# Patient Record
Sex: FEMALE | Race: WHITE | Employment: OTHER | URBAN - METROPOLITAN AREA
[De-identification: names, ages, dates, MRNs, and addresses within clinical notes are randomized per-mention and may not be internally consistent; named-entity substitution may affect disease eponyms.]

---

## 2020-10-15 ENCOUNTER — HOSPITAL ENCOUNTER (OUTPATIENT)
Facility: CLINIC | Age: 65
End: 2020-10-15
Attending: OPHTHALMOLOGY | Admitting: OPHTHALMOLOGY
Payer: COMMERCIAL

## 2020-10-15 DIAGNOSIS — Z11.59 ENCOUNTER FOR SCREENING FOR OTHER VIRAL DISEASES: Primary | ICD-10-CM

## 2020-11-14 DIAGNOSIS — Z11.59 ENCOUNTER FOR SCREENING FOR OTHER VIRAL DISEASES: ICD-10-CM

## 2020-11-14 PROCEDURE — U0003 INFECTIOUS AGENT DETECTION BY NUCLEIC ACID (DNA OR RNA); SEVERE ACUTE RESPIRATORY SYNDROME CORONAVIRUS 2 (SARS-COV-2) (CORONAVIRUS DISEASE [COVID-19]), AMPLIFIED PROBE TECHNIQUE, MAKING USE OF HIGH THROUGHPUT TECHNOLOGIES AS DESCRIBED BY CMS-2020-01-R: HCPCS | Performed by: OPHTHALMOLOGY

## 2020-11-15 LAB
SARS-COV-2 RNA SPEC QL NAA+PROBE: NOT DETECTED
SPECIMEN SOURCE: NORMAL

## 2020-11-16 RX ORDER — LOSARTAN POTASSIUM AND HYDROCHLOROTHIAZIDE 12.5; 1 MG/1; MG/1
1 TABLET ORAL
COMMUNITY
Start: 2020-05-20 | End: 2020-11-17 | Stop reason: HOSPADM

## 2020-11-16 RX ORDER — BUPROPION HYDROCHLORIDE 150 MG/1
150 TABLET ORAL
COMMUNITY
Start: 2020-05-20 | End: 2020-11-17 | Stop reason: HOSPADM

## 2020-11-17 ENCOUNTER — ANESTHESIA EVENT (OUTPATIENT)
Dept: SURGERY | Facility: CLINIC | Age: 65
End: 2020-11-17

## 2020-11-17 RX ORDER — CYCLOPENTOLATE HYDROCHLORIDE 10 MG/ML
1 SOLUTION/ DROPS OPHTHALMIC
Status: CANCELLED | OUTPATIENT
Start: 2020-11-17

## 2020-11-17 RX ORDER — PHENYLEPHRINE HYDROCHLORIDE 25 MG/ML
1 SOLUTION/ DROPS OPHTHALMIC
Status: CANCELLED | OUTPATIENT
Start: 2020-11-17

## 2020-11-17 RX ORDER — TROPICAMIDE 10 MG/ML
1 SOLUTION/ DROPS OPHTHALMIC
Status: CANCELLED | OUTPATIENT
Start: 2020-11-17

## 2020-11-17 NOTE — H&P
[facility]  TOTAL E[facility] CARE  [address]  [facility] [address]  [phone]  Dept: [phone]    OPHTHALMOLOGY PRE-OPERATIVE  HISTORY AND PHYSICAL    DATE OF H/P:  10/21/2020    DATE OF SURGERY:  2020  PROCEDURE:  Procedure(s):  Cataract Removal with Implant--symfony lens, Left Eye  LENS IMPLANT:  [id number] +20.5  REFRACTIVE GOAL:  PL Sph  SURGEON:  Du Norton MD    ANESTHESIA:  TOPICAL / MAC    OR CASE REQUIREMENTS:  Symfony toric IOL with axis at 89 degrees.    DEMOGRAPHICS:  Demographic Information on Kristin J Prokosch:    Kristin J Prokosch  Gender: female  : 1955  [address]  [place] [address]  [phone] (home)     Medical Record: [id number]  Social Security Number: [id number]  Pharmacy: Data Unavailable  Primary Care Provider: No primary care provider on file.    Parent's names are: Data Unavailable (mother) and Data Unavailable (father).    Insurance: Payor: [employer] / Plan: [employer] / Product Type: HMO /     OCULAR HISTORY:  Cataracts, each eye.  Astigmatism / presbyopia.    HISTORIES:  HTN, s/p hip replacement.    No past medical history on file.    History reviewed. No pertinent surgical history.    History reviewed. No pertinent family history.    Social History     Tobacco Use     Smoking status: Not on file   Substance Use Topics     Alcohol use: Not on file       MEDICATIONS:  No current facility-administered medications for this encounter.      Current Outpatient Medications   Medication Sig     buPROPion (WELLBUTRIN XL) 150 MG 24 hr tablet Take 150 mg by mouth     losartan-hydrochlorothiazide (HYZAAR) 100-12.5 MG tablet Take 1 tablet by mouth       ALLERGIES:   No Known Allergies    PERTINENT SYSTEMS REVIEW:    1. No - Do you have a history of heart attack, stroke, stent, bypass or surgery on an artery in the head, neck, heart or legs?  2. No - Do you ever have any pain or discomfort in your chest?  3. No - Do you have a  history of  Heart Failure?  4. No - Are you troubled by shortness of breath when walking: On the level, up a slight hill or at night?  5. No - Do you currently have a cold, bronchitis or other respiratory infection?  6. No - Do you have a cough, shortness of breath or wheezing?  7. No - Do you sometimes get pains in the calves of your legs when you walk?  8. No - Do you or anyone in your family have previous history of blood clots?  9. No - Do you or does anyone in your family have a serious bleeding problem such as prolonged bleeding following surgeries or cuts?  10. No - Have you ever had problems with anemia or been told to take iron pills?  11. No - Have you had any abnormal blood loss such as black, tarry or bloody stools, or abnormal vaginal bleeding?  12. No - Have you ever had a blood transfusion?  13. No - Have you or any of your relatives ever had problems with anesthesia?  14. No - Do you have sleep apnea, excessive snoring or daytime drowsiness?  15. No - Do you have any prosthetic heart valves?  16. Yes: hip replacement - Do you have prosthetic joints?    EXAMINATION:  Vitals were reviewed                     Vison:  Va, right - 20/60, left - 20/100;   BAT, right - 20/100;  HEENT:  Cataract, otherwise unremarkable.  LUNGS:  Clear  CV:  Regular rate and rhythm without murmur  ABD:  Soft and nontender  NEURO:  Alert and nonfocal    IMPRESSION:  Patient cleared for ophthalmic surgery.  Low risk with monitored, light sedation.  I have assessed the patient's DVT risk, and no additional orders necessary.    PLAN:  Procedure(s):  Cataract Removal with Implant--symfony lens, Left Eye      Du Norton MD

## 2020-11-18 ENCOUNTER — ANESTHESIA (OUTPATIENT)
Dept: SURGERY | Facility: CLINIC | Age: 65
End: 2020-11-18

## 2021-02-18 DIAGNOSIS — Z11.59 ENCOUNTER FOR SCREENING FOR OTHER VIRAL DISEASES: ICD-10-CM

## 2021-03-05 RX ORDER — BUPROPION HYDROCHLORIDE 150 MG/1
TABLET ORAL
COMMUNITY
Start: 2020-11-14

## 2021-03-05 RX ORDER — LOSARTAN POTASSIUM AND HYDROCHLOROTHIAZIDE 12.5; 1 MG/1; MG/1
1 TABLET ORAL DAILY
COMMUNITY
Start: 2020-12-27

## 2021-03-06 DIAGNOSIS — Z11.59 ENCOUNTER FOR SCREENING FOR OTHER VIRAL DISEASES: ICD-10-CM

## 2021-03-06 LAB
SARS-COV-2 RNA RESP QL NAA+PROBE: NORMAL
SPECIMEN SOURCE: NORMAL

## 2021-03-06 PROCEDURE — U0005 INFEC AGEN DETEC AMPLI PROBE: HCPCS | Performed by: OPHTHALMOLOGY

## 2021-03-06 PROCEDURE — U0003 INFECTIOUS AGENT DETECTION BY NUCLEIC ACID (DNA OR RNA); SEVERE ACUTE RESPIRATORY SYNDROME CORONAVIRUS 2 (SARS-COV-2) (CORONAVIRUS DISEASE [COVID-19]), AMPLIFIED PROBE TECHNIQUE, MAKING USE OF HIGH THROUGHPUT TECHNOLOGIES AS DESCRIBED BY CMS-2020-01-R: HCPCS | Performed by: OPHTHALMOLOGY

## 2021-03-07 LAB
LABORATORY COMMENT REPORT: NORMAL
SARS-COV-2 RNA RESP QL NAA+PROBE: NEGATIVE
SPECIMEN SOURCE: NORMAL

## 2021-03-08 ENCOUNTER — ANESTHESIA EVENT (OUTPATIENT)
Dept: SURGERY | Facility: CLINIC | Age: 66
End: 2021-03-08
Payer: MEDICARE

## 2021-03-08 NOTE — ANESTHESIA PREPROCEDURE EVALUATION
Anesthesia Pre-Procedure Evaluation    Patient: Kristin Miranda   MRN: [id number] : 1955        Preoperative Diagnosis: Cataract [H26.9]   Procedure : Procedure(s):  Cataract removal with implant. Toric     No past medical history on file.   No past surgical history on file.   No Known Allergies   Social History     Tobacco Use     Smoking status: Not on file   Substance Use Topics     Alcohol use: Not on file      Wt Readings from Last 1 Encounters:   No data found for Wt        Anesthesia Evaluation   Pt has had prior anesthetic. Type: General and MAC.    No history of anesthetic complications       ROS/MED HX  ENT/Pulmonary:  - neg pulmonary ROS     Neurologic: Comment: ADHD      Cardiovascular:  - neg cardiovascular ROS   (+) hypertension-----    METS/Exercise Tolerance:     Hematologic:  - neg hematologic  ROS     Musculoskeletal:  - neg musculoskeletal ROS     GI/Hepatic:  - neg GI/hepatic ROS     Renal/Genitourinary:  - neg Renal ROS     Endo:  - neg endo ROS     Psychiatric/Substance Use:     (+) psychiatric history depression     Infectious Disease:  - neg infectious disease ROS     Malignancy:  - neg malignancy ROS     Other:  - neg other ROS          Physical Exam    Airway  airway exam normal      Mallampati: II   TM distance: > 3 FB   Neck ROM: full   Mouth opening: > 3 cm    Respiratory Devices and Support         Dental  no notable dental history         Cardiovascular   cardiovascular exam normal       Rhythm and rate: regular and normal     Pulmonary   pulmonary exam normal        breath sounds clear to auscultation           OUTSIDE LABS:  CBC: No results found for: WBC, HGB, HCT, PLT  BMP: No results found for: NA, POTASSIUM, CHLORIDE, CO2, BUN, CR, GLC  COAGS: No results found for: PTT, INR, FIBR  POC: No results found for: BGM, HCG, HCGS  HEPATIC: No results found for: ALBUMIN, PROTTOTAL, ALT, AST, GGT, ALKPHOS, BILITOTAL, BILIDIRECT, LISA  OTHER: No results found for: PH, LACT, A1C,  QUITA, PHOS, MAG, LIPASE, AMYLASE, TSH, T4, T3, CRP, SED    Anesthesia Plan    ASA Status:  2   NPO Status:  NPO Appropriate    Anesthesia Type: MAC.              Consents    Anesthesia Plan(s) and associated risks, benefits, and realistic alternatives discussed. Questions answered and patient/representative(s) expressed understanding.     - Discussed with:  Patient         Postoperative Care    Pain management: Oral pain medications.        Comments:                ESTRELLA Deleon CRNA

## 2021-03-10 ENCOUNTER — HOSPITAL ENCOUNTER (OUTPATIENT)
Facility: CLINIC | Age: 66
Discharge: HOME | End: 2021-03-10
Attending: OPHTHALMOLOGY | Admitting: OPHTHALMOLOGY

## 2021-03-10 ENCOUNTER — HOSPITAL ENCOUNTER (OUTPATIENT)
Facility: CLINIC | Age: 66
Discharge: HOME | End: 2021-03-10
Attending: OPHTHALMOLOGY | Admitting: OPHTHALMOLOGY
Payer: MEDICARE

## 2021-03-10 ENCOUNTER — ANESTHESIA (OUTPATIENT)
Dept: SURGERY | Facility: CLINIC | Age: 66
End: 2021-03-10
Payer: MEDICARE

## 2021-03-10 VITALS
TEMPERATURE: 97.6 F | DIASTOLIC BLOOD PRESSURE: 83 MMHG | HEIGHT: 65 IN | RESPIRATION RATE: 18 BRPM | BODY MASS INDEX: 26.99 KG/M2 | SYSTOLIC BLOOD PRESSURE: 142 MMHG | OXYGEN SATURATION: 97 % | HEART RATE: 75 BPM | WEIGHT: 162 LBS

## 2021-03-10 PROCEDURE — 370N000004 HC ANESTHESIA CATARACT PACKAGE: Performed by: OPHTHALMOLOGY

## 2021-03-10 PROCEDURE — 761N000008 HC RECOVERY CATRACT PACKAGE: Performed by: OPHTHALMOLOGY

## 2021-03-10 PROCEDURE — 258N000003 HC RX IP 258 OP 636: Performed by: NURSE ANESTHETIST, CERTIFIED REGISTERED

## 2021-03-10 PROCEDURE — 250N000011 HC RX IP 250 OP 636: Performed by: OPHTHALMOLOGY

## 2021-03-10 PROCEDURE — 250N000011 HC RX IP 250 OP 636: Performed by: NURSE ANESTHETIST, CERTIFIED REGISTERED

## 2021-03-10 PROCEDURE — 250N000009 HC RX 250: Performed by: OPHTHALMOLOGY

## 2021-03-10 PROCEDURE — 250N000009 HC RX 250: Performed by: NURSE ANESTHETIST, CERTIFIED REGISTERED

## 2021-03-10 PROCEDURE — V2787 ASTIGMATISM-CORRECT FUNCTION: HCPCS | Performed by: OPHTHALMOLOGY

## 2021-03-10 PROCEDURE — V2632 POST CHMBR INTRAOCULAR LENS: HCPCS | Performed by: OPHTHALMOLOGY

## 2021-03-10 PROCEDURE — 360N000007 HC CATARACT SURGICAL PACKAGE: Performed by: OPHTHALMOLOGY

## 2021-03-10 DEVICE — IMPLANTABLE DEVICE: Type: IMPLANTABLE DEVICE | Site: EYE | Status: FUNCTIONAL

## 2021-03-10 RX ORDER — SODIUM CHLORIDE, SODIUM LACTATE, POTASSIUM CHLORIDE, CALCIUM CHLORIDE 600; 310; 30; 20 MG/100ML; MG/100ML; MG/100ML; MG/100ML
INJECTION, SOLUTION INTRAVENOUS CONTINUOUS
Status: DISCONTINUED | OUTPATIENT
Start: 2021-03-10 | End: 2021-03-10 | Stop reason: HOSPADM

## 2021-03-10 RX ORDER — HYDROMORPHONE HYDROCHLORIDE 1 MG/ML
.3-.5 INJECTION, SOLUTION INTRAMUSCULAR; INTRAVENOUS; SUBCUTANEOUS EVERY 10 MIN PRN
Status: CANCELLED | OUTPATIENT
Start: 2021-03-10

## 2021-03-10 RX ORDER — NALOXONE HYDROCHLORIDE 0.4 MG/ML
0.2 INJECTION, SOLUTION INTRAMUSCULAR; INTRAVENOUS; SUBCUTANEOUS
Status: CANCELLED | OUTPATIENT
Start: 2021-03-10 | End: 2021-03-11

## 2021-03-10 RX ORDER — TROPICAMIDE 10 MG/ML
1 SOLUTION/ DROPS OPHTHALMIC
Status: COMPLETED | OUTPATIENT
Start: 2021-03-10 | End: 2021-03-10

## 2021-03-10 RX ORDER — FENTANYL CITRATE 50 UG/ML
25-50 INJECTION, SOLUTION INTRAMUSCULAR; INTRAVENOUS
Status: CANCELLED | OUTPATIENT
Start: 2021-03-10

## 2021-03-10 RX ORDER — BALANCED SALT SOLUTION 6.4; .75; .48; .3; 3.9; 1.7 MG/ML; MG/ML; MG/ML; MG/ML; MG/ML; MG/ML
SOLUTION OPHTHALMIC PRN
Status: DISCONTINUED | OUTPATIENT
Start: 2021-03-10 | End: 2021-03-10 | Stop reason: HOSPADM

## 2021-03-10 RX ORDER — PROPARACAINE HYDROCHLORIDE 5 MG/ML
SOLUTION/ DROPS OPHTHALMIC PRN
Status: DISCONTINUED | OUTPATIENT
Start: 2021-03-10 | End: 2021-03-10 | Stop reason: HOSPADM

## 2021-03-10 RX ORDER — ONDANSETRON 4 MG/1
4 TABLET, ORALLY DISINTEGRATING ORAL EVERY 30 MIN PRN
Status: CANCELLED | OUTPATIENT
Start: 2021-03-10

## 2021-03-10 RX ORDER — MEPERIDINE HYDROCHLORIDE 25 MG/ML
12.5 INJECTION INTRAMUSCULAR; INTRAVENOUS; SUBCUTANEOUS
Status: CANCELLED | OUTPATIENT
Start: 2021-03-10

## 2021-03-10 RX ORDER — NALOXONE HYDROCHLORIDE 0.4 MG/ML
0.4 INJECTION, SOLUTION INTRAMUSCULAR; INTRAVENOUS; SUBCUTANEOUS
Status: CANCELLED | OUTPATIENT
Start: 2021-03-10 | End: 2021-03-11

## 2021-03-10 RX ORDER — ONDANSETRON 2 MG/ML
4 INJECTION INTRAMUSCULAR; INTRAVENOUS EVERY 30 MIN PRN
Status: CANCELLED | OUTPATIENT
Start: 2021-03-10

## 2021-03-10 RX ORDER — DIMENHYDRINATE 50 MG/ML
25 INJECTION, SOLUTION INTRAMUSCULAR; INTRAVENOUS
Status: CANCELLED | OUTPATIENT
Start: 2021-03-10

## 2021-03-10 RX ORDER — LIDOCAINE 40 MG/G
CREAM TOPICAL
Status: DISCONTINUED | OUTPATIENT
Start: 2021-03-10 | End: 2021-03-10 | Stop reason: HOSPADM

## 2021-03-10 RX ORDER — LIDOCAINE HYDROCHLORIDE 20 MG/ML
JELLY TOPICAL PRN
Status: DISCONTINUED | OUTPATIENT
Start: 2021-03-10 | End: 2021-03-10 | Stop reason: HOSPADM

## 2021-03-10 RX ORDER — SODIUM CHLORIDE, SODIUM LACTATE, POTASSIUM CHLORIDE, CALCIUM CHLORIDE 600; 310; 30; 20 MG/100ML; MG/100ML; MG/100ML; MG/100ML
INJECTION, SOLUTION INTRAVENOUS CONTINUOUS
Status: CANCELLED | OUTPATIENT
Start: 2021-03-10

## 2021-03-10 RX ADMIN — CYCLOPENTOLATE HYDROCHLORIDE AND PHENYLEPHRINE HYDROCHLORIDE 1 DROP: 2; 10 SOLUTION/ DROPS OPHTHALMIC at 06:47

## 2021-03-10 RX ADMIN — TROPICAMIDE 1 DROP: 10 SOLUTION/ DROPS OPHTHALMIC at 06:54

## 2021-03-10 RX ADMIN — CYCLOPENTOLATE HYDROCHLORIDE AND PHENYLEPHRINE HYDROCHLORIDE 1 DROP: 2; 10 SOLUTION/ DROPS OPHTHALMIC at 06:54

## 2021-03-10 RX ADMIN — TROPICAMIDE 1 DROP: 10 SOLUTION/ DROPS OPHTHALMIC at 06:47

## 2021-03-10 RX ADMIN — LIDOCAINE HYDROCHLORIDE 1 ML: 10 INJECTION, SOLUTION EPIDURAL; INFILTRATION; INTRACAUDAL; PERINEURAL at 06:56

## 2021-03-10 RX ADMIN — TROPICAMIDE 1 DROP: 10 SOLUTION/ DROPS OPHTHALMIC at 06:57

## 2021-03-10 RX ADMIN — SODIUM CHLORIDE, POTASSIUM CHLORIDE, SODIUM LACTATE AND CALCIUM CHLORIDE 100 ML: 600; 310; 30; 20 INJECTION, SOLUTION INTRAVENOUS at 06:56

## 2021-03-10 RX ADMIN — MIDAZOLAM 2 MG: 1 INJECTION INTRAMUSCULAR; INTRAVENOUS at 07:39

## 2021-03-10 RX ADMIN — CYCLOPENTOLATE HYDROCHLORIDE AND PHENYLEPHRINE HYDROCHLORIDE 1 DROP: 2; 10 SOLUTION/ DROPS OPHTHALMIC at 06:57

## 2021-03-10 ASSESSMENT — MIFFLIN-ST. JEOR: SCORE: 1270.71

## 2021-03-10 NOTE — ANESTHESIA CARE TRANSFER NOTE
Patient: Kristin Miranda    Procedure(s):  Cataract removal with implant. Toric    Diagnosis: Cataract [H26.9]  Diagnosis Additional Information: No value filed.    Anesthesia Type:   MAC     Note:    Oropharynx: spontaneously breathing  Level of Consciousness: awake  Oxygen Supplementation: room air    Independent Airway: airway patency satisfactory and stable  Dentition: dentition unchanged  Vital Signs Stable: post-procedure vital signs reviewed and stable  Report to RN Given: handoff report given  Patient transferred to: Phase II    Handoff Report: Identifed the Patient, Identified the Reponsible Provider, Reviewed the pertinent medical history, Discussed the surgical course, Reviewed Intra-OP anesthesia mangement and issues during anesthesia, Set expectations for post-procedure period and Allowed opportunity for questions and acknowledgement of understanding      Vitals: (Last set prior to Anesthesia Care Transfer)  CRNA VITALS  3/10/2021 0730 - 3/10/2021 0800      3/10/2021             Pulse:  82    SpO2:  99 %        Electronically Signed By: Moises Flynn CRNA, APRN CRNA  March 10, 2021  8:00 AM

## 2021-03-10 NOTE — H&P
[facility]  TOTAL EYE CARE  [address]  [facility] [place] [place] [address]  [phone]  Dept: [phone]    OPHTHALMOLOGY PRE-OPERATIVE  HISTORY AND PHYSICAL    DATE OF H/P:  2021    DATE OF SURGERY:  March 10, 2021  PROCEDURE:  Procedure(s):  Cataract removal with implant. Toric, Left Eye  LENS IMPLANT:  [id number] +20.5  REFRACTIVE GOAL:  PL Sph  SURGEON:  Du Norton MD    ANESTHESIA:  TOPICAL / MAC    OR CASE REQUIREMENTS:  Toric IOL with axis at 89 degrees.    DEMOGRAPHICS:  Demographic Information on Kristin J Prokosch:    Kristin J Prokosch  Gender: female  : 1955  [address]  [place] [address]  [phone] (home)     Medical Record: [id number]  Social Security Number: [id number]  Pharmacy: Data Unavailable  Primary Care Provider: Vanesa Chen    Parent's names are: Data Unavailable (mother) and Data Unavailable (father).    Insurance: Payor: MEDICARE / Plan: MEDICARE / Product Type: Medicare /     OCULAR HISTORY:  Cataracts, each eye.  Astigmatism right eye.    HISTORIES:  HTN, arthritis.    No past medical history on file.    No past surgical history on file.    No family history on file.    Social History     Tobacco Use     Smoking status: Not on file   Substance Use Topics     Alcohol use: Not on file       MEDICATIONS:  No current facility-administered medications for this encounter.      Current Outpatient Medications   Medication Sig     buPROPion (WELLBUTRIN XL) 150 MG 24 hr tablet TK 1 T PO D. TK ALONG WITH 300MG TS.     losartan-hydrochlorothiazide (HYZAAR) 100-12.5 MG tablet Take 1 tablet by mouth daily       ALLERGIES:   No Known Allergies    PERTINENT SYSTEMS REVIEW:    1. No - Do you have a history of heart attack, stroke, stent, bypass or surgery on an artery in the head, neck, heart or legs?  2. No - Do you ever have any pain or discomfort in your chest?  3. No - Do you have a history of  Heart Failure?  4. No - Are you troubled by shortness of breath when  walking: On the level, up a slight hill or at night?  5. No - Do you currently have a cold, bronchitis or other respiratory infection?  6. No - Do you have a cough, shortness of breath or wheezing?  7. No - Do you sometimes get pains in the calves of your legs when you walk?  8. No - Do you or anyone in your family have previous history of blood clots?  9. No - Do you or does anyone in your family have a serious bleeding problem such as prolonged bleeding following surgeries or cuts?  10. No - Have you ever had problems with anemia or been told to take iron pills?  11. No - Have you had any abnormal blood loss such as black, tarry or bloody stools, or abnormal vaginal bleeding?  12. No - Have you ever had a blood transfusion?  13. No - Have you or any of your relatives ever had problems with anesthesia?  14. No - Do you have sleep apnea, excessive snoring or daytime drowsiness?  15. No - Do you have any prosthetic heart valves?  16. No - Do you have prosthetic joints?    EXAMINATION:  Vitals were reviewed  Vison:  Va, right - 20/60, left - 20/60;   BAT, right - 20/100, left - 20/100;  HEENT:  Cataract, otherwise unremarkable.  LUNGS:  Clear  CV:  Regular rate and rhythm without murmur  ABD:  Soft and nontender  NEURO:  Alert and nonfocal    IMPRESSION:  Patient cleared for ophthalmic surgery.  Low risk with monitored, light sedation.  I have assessed the patient's DVT risk, and no additional orders necessary.    PLAN:  Procedure(s):  Cataract removal with implant. Toric, Left Eye      Du Norton MD

## 2021-03-10 NOTE — ANESTHESIA POSTPROCEDURE EVALUATION
Patient: rKistin Miranda    Procedure(s):  Cataract removal with implant. Toric    Diagnosis:Cataract [H26.9]  Diagnosis Additional Information: No value filed.    Anesthesia Type:  MAC    Note:  Disposition: Outpatient   Postop Pain Control: Uneventful            Sign Out: Well controlled pain   PONV: No   Neuro/Psych: Uneventful            Sign Out: Acceptable/Baseline neuro status   Airway/Respiratory: Uneventful            Sign Out: Acceptable/Baseline resp. status   CV/Hemodynamics: Uneventful            Sign Out: Acceptable CV status   Other NRE: NONE   DID A NON-ROUTINE EVENT OCCUR? No         Last vitals:  Vitals:    03/10/21 0628 03/10/21 0802 03/10/21 0822   BP: (!) 152/86 (!) 154/96 (!) 142/83   Pulse: 75     Resp: 18 18    Temp: 36.6  C (97.8  F) 36.4  C (97.6  F)    SpO2: 99% 97%        Last vitals prior to Anesthesia Care Transfer:  CRNA VITALS  3/10/2021 0730 - 3/10/2021 0830      3/10/2021             Pulse:  82    SpO2:  99 %          Electronically Signed By: Moises Flynn CRNA, APRN CRNA  March 10, 2021  8:31 AM

## 2021-03-10 NOTE — OP NOTE
OPHTHALMOLOGY OPERATIVE NOTE    PATIENT: Kristin Miranda  DATE OF SURGERY: 3/10/2021  PREOPERATIVE DIAGNOSIS:  Senile Nuclear Cataract, Left eye  POSTOPERATIVE DIAGNOSIS:  Senile Nuclear Cataract, Left eye  OPERATIVE PROCEDURE:  Phacoemulsification with placement of advanced toric intraocular lens  SURGEON:  Du Norton MD  ANESTHESIA:  Topical / MAC  EBL:  None  SPECIMENS:  None  COMPLICATIONS:  None    PROCEDURE:  The patient was brought to the operating room at [facility].  The eye was anesthetized with topical anesthetic, and a sterile marking pen was used to flor the limbus at the 0 and 180 axis.  The left eye was prepped and draped in the usual fashion for cataract surgery.  A wire lid speculum was inserted.  A super sharp blade was used to make a paracentesis at the 5 O'clock position.  The super sharp blade was used to make a partial thickness temporal groove, which was 3 mm in length.  0.8 mL of non-preserved epi-Shugarcaine was injected into the anterior chamber.  Viscoelastic was used to inflate the anterior chamber through a cannula.  A 2.5 mm microkeratome was used to make a temporal clear corneal incision in a two-plane fashion.  A cystotome needle and forceps were used to make a capsulorrhexis.  Hydrodissection and hydrodelineation were performed with Balance Salt Solution.  The lens was then phacoemulsified and removed without complications.  The cortical material was removed with bimanual irrigation and aspiration.  The capsular bag was filled with viscoelastic.  A posterior chamber intraocular lens, preselected and recorded, was folded and inserted into the capsular bag.  The lens was then rotated to the correct axis according to the preoperative data, using the limbal markings and an axial gauge.  The viscoelastic was removed with the irrigation and aspiration tip.  Balanced Salt Solution with Vigamox, 150mg/0.1mL,was used to refill the anterior chamber, and  make final lens axis adjustments.  The wounds were checked for water tightness and required no suture.  The wire lid speculum was removed.  The patient's left eye was cleaned and a drop of each post-operative drop was placed, followed by a trimble shield.  The patient tolerated the procedure well, and there were no complications.      Du Norton MD

## 2021-03-30 ENCOUNTER — ANESTHESIA EVENT (OUTPATIENT)
Dept: SURGERY | Facility: CLINIC | Age: 66
End: 2021-03-30
Payer: MEDICARE

## 2021-03-30 NOTE — ANESTHESIA PREPROCEDURE EVALUATION
Anesthesia Pre-Procedure Evaluation    Patient: Kristin Miranda   MRN: [id number] : 1955        Preoperative Diagnosis: Cataract [H26.9]   Procedure : Procedure(s):  Cataract removal with implant. Toric     No past medical history on file.   Past Surgical History:   Procedure Laterality Date     PHACOEMULSIFICATION CLEAR CORNEA WITH TORIC INTRAOCULAR LENS IMPLANT Left 3/10/2021    Procedure: Cataract removal with implant. Toric;  Surgeon: Du Norton MD;  Location: [facility] OR      No Known Allergies   Social History     Tobacco Use     Smoking status: Not on file   Substance Use Topics     Alcohol use: Not on file      Wt Readings from Last 1 Encounters:   03/10/21 73.5 kg (162 lb)        Anesthesia Evaluation   Pt has had prior anesthetic. Type: General and MAC.    No history of anesthetic complications       ROS/MED HX  ENT/Pulmonary: Comment: cataract      Neurologic: Comment: ADHD      Cardiovascular:  - neg cardiovascular ROS   (+) hypertension-----    METS/Exercise Tolerance:     Hematologic:  - neg hematologic  ROS     Musculoskeletal:  - neg musculoskeletal ROS     GI/Hepatic:  - neg GI/hepatic ROS     Renal/Genitourinary:  - neg Renal ROS     Endo:  - neg endo ROS     Psychiatric/Substance Use:     (+) psychiatric history depression     Infectious Disease:  - neg infectious disease ROS     Malignancy:  - neg malignancy ROS     Other:  - neg other ROS          Physical Exam    Airway  airway exam normal      Mallampati: II   TM distance: > 3 FB   Neck ROM: full   Mouth opening: > 3 cm    Respiratory Devices and Support         Dental  no notable dental history         Cardiovascular   cardiovascular exam normal       Rhythm and rate: regular and normal     Pulmonary   pulmonary exam normal        breath sounds clear to auscultation           OUTSIDE LABS:  CBC: No results found for: WBC, HGB, HCT, PLT  BMP: No results found for: NA, POTASSIUM, CHLORIDE, CO2, BUN, CR, GLC  COAGS: No results  found for: PTT, INR, FIBR  POC: No results found for: BGM, HCG, HCGS  HEPATIC: No results found for: ALBUMIN, PROTTOTAL, ALT, AST, GGT, ALKPHOS, BILITOTAL, BILIDIRECT, LISA  OTHER: No results found for: PH, LACT, A1C, QUITA, PHOS, MAG, LIPASE, AMYLASE, TSH, T4, T3, CRP, SED    Anesthesia Plan    ASA Status:  2   NPO Status:  NPO Appropriate    Anesthesia Type: MAC.              Consents    Anesthesia Plan(s) and associated risks, benefits, and realistic alternatives discussed. Questions answered and patient/representative(s) expressed understanding.     - Discussed with:  Patient         Postoperative Care    Pain management: Oral pain medications.        Comments:                    ESTRELLA Alegria CRNA

## 2021-04-02 DIAGNOSIS — Z11.59 ENCOUNTER FOR SCREENING FOR OTHER VIRAL DISEASES: ICD-10-CM

## 2021-04-02 LAB
SARS-COV-2 RNA RESP QL NAA+PROBE: NORMAL
SPECIMEN SOURCE: NORMAL

## 2021-04-02 PROCEDURE — U0005 INFEC AGEN DETEC AMPLI PROBE: HCPCS | Performed by: OPHTHALMOLOGY

## 2021-04-02 PROCEDURE — U0003 INFECTIOUS AGENT DETECTION BY NUCLEIC ACID (DNA OR RNA); SEVERE ACUTE RESPIRATORY SYNDROME CORONAVIRUS 2 (SARS-COV-2) (CORONAVIRUS DISEASE [COVID-19]), AMPLIFIED PROBE TECHNIQUE, MAKING USE OF HIGH THROUGHPUT TECHNOLOGIES AS DESCRIBED BY CMS-2020-01-R: HCPCS | Performed by: OPHTHALMOLOGY

## 2021-04-05 ENCOUNTER — ANESTHESIA (OUTPATIENT)
Dept: SURGERY | Facility: CLINIC | Age: 66
End: 2021-04-05
Payer: MEDICARE

## 2021-04-05 ENCOUNTER — HOSPITAL ENCOUNTER (OUTPATIENT)
Facility: CLINIC | Age: 66
Discharge: HOME | End: 2021-04-05
Attending: OPHTHALMOLOGY | Admitting: OPHTHALMOLOGY
Payer: MEDICARE

## 2021-04-05 VITALS
TEMPERATURE: 97.8 F | RESPIRATION RATE: 16 BRPM | HEIGHT: 65 IN | SYSTOLIC BLOOD PRESSURE: 125 MMHG | WEIGHT: 162 LBS | DIASTOLIC BLOOD PRESSURE: 92 MMHG | OXYGEN SATURATION: 99 % | BODY MASS INDEX: 26.99 KG/M2 | HEART RATE: 79 BPM

## 2021-04-05 PROCEDURE — 370N000004 HC ANESTHESIA CATARACT PACKAGE: Performed by: OPHTHALMOLOGY

## 2021-04-05 PROCEDURE — 250N000009 HC RX 250: Performed by: OPHTHALMOLOGY

## 2021-04-05 PROCEDURE — 761N000008 HC RECOVERY CATRACT PACKAGE: Performed by: OPHTHALMOLOGY

## 2021-04-05 PROCEDURE — 258N000003 HC RX IP 258 OP 636: Performed by: NURSE ANESTHETIST, CERTIFIED REGISTERED

## 2021-04-05 PROCEDURE — 250N000011 HC RX IP 250 OP 636: Performed by: NURSE ANESTHETIST, CERTIFIED REGISTERED

## 2021-04-05 PROCEDURE — 360N000007 HC CATARACT SURGICAL PACKAGE: Performed by: OPHTHALMOLOGY

## 2021-04-05 PROCEDURE — V2632 POST CHMBR INTRAOCULAR LENS: HCPCS | Performed by: OPHTHALMOLOGY

## 2021-04-05 PROCEDURE — 250N000009 HC RX 250: Performed by: NURSE ANESTHETIST, CERTIFIED REGISTERED

## 2021-04-05 PROCEDURE — 250N000011 HC RX IP 250 OP 636: Performed by: OPHTHALMOLOGY

## 2021-04-05 DEVICE — EYE IMP IOL AMO PCL TECNIS ZCB00 20.5: Type: IMPLANTABLE DEVICE | Site: EYE | Status: FUNCTIONAL

## 2021-04-05 RX ORDER — SODIUM CHLORIDE, SODIUM LACTATE, POTASSIUM CHLORIDE, CALCIUM CHLORIDE 600; 310; 30; 20 MG/100ML; MG/100ML; MG/100ML; MG/100ML
INJECTION, SOLUTION INTRAVENOUS CONTINUOUS
Status: DISCONTINUED | OUTPATIENT
Start: 2021-04-05 | End: 2021-04-05 | Stop reason: HOSPADM

## 2021-04-05 RX ORDER — TROPICAMIDE 10 MG/ML
1 SOLUTION/ DROPS OPHTHALMIC
Status: COMPLETED | OUTPATIENT
Start: 2021-04-05 | End: 2021-04-05

## 2021-04-05 RX ORDER — LIDOCAINE 40 MG/G
CREAM TOPICAL
Status: DISCONTINUED | OUTPATIENT
Start: 2021-04-05 | End: 2021-04-05 | Stop reason: HOSPADM

## 2021-04-05 RX ORDER — BALANCED SALT SOLUTION 6.4; .75; .48; .3; 3.9; 1.7 MG/ML; MG/ML; MG/ML; MG/ML; MG/ML; MG/ML
SOLUTION OPHTHALMIC PRN
Status: DISCONTINUED | OUTPATIENT
Start: 2021-04-05 | End: 2021-04-05 | Stop reason: HOSPADM

## 2021-04-05 RX ORDER — PROPARACAINE HYDROCHLORIDE 5 MG/ML
SOLUTION/ DROPS OPHTHALMIC PRN
Status: DISCONTINUED | OUTPATIENT
Start: 2021-04-05 | End: 2021-04-05 | Stop reason: HOSPADM

## 2021-04-05 RX ORDER — LIDOCAINE HYDROCHLORIDE 20 MG/ML
JELLY TOPICAL PRN
Status: DISCONTINUED | OUTPATIENT
Start: 2021-04-05 | End: 2021-04-05 | Stop reason: HOSPADM

## 2021-04-05 RX ADMIN — LIDOCAINE HYDROCHLORIDE 0.1 ML: 10 INJECTION, SOLUTION EPIDURAL; INFILTRATION; INTRACAUDAL; PERINEURAL at 08:14

## 2021-04-05 RX ADMIN — TROPICAMIDE 1 DROP: 10 SOLUTION/ DROPS OPHTHALMIC at 08:01

## 2021-04-05 RX ADMIN — SODIUM CHLORIDE, POTASSIUM CHLORIDE, SODIUM LACTATE AND CALCIUM CHLORIDE: 600; 310; 30; 20 INJECTION, SOLUTION INTRAVENOUS at 08:13

## 2021-04-05 RX ADMIN — TROPICAMIDE 1 DROP: 10 SOLUTION/ DROPS OPHTHALMIC at 08:13

## 2021-04-05 RX ADMIN — CYCLOPENTOLATE HYDROCHLORIDE AND PHENYLEPHRINE HYDROCHLORIDE 1 DROP: 2; 10 SOLUTION/ DROPS OPHTHALMIC at 07:54

## 2021-04-05 RX ADMIN — MIDAZOLAM 2 MG: 1 INJECTION INTRAMUSCULAR; INTRAVENOUS at 08:58

## 2021-04-05 RX ADMIN — CYCLOPENTOLATE HYDROCHLORIDE AND PHENYLEPHRINE HYDROCHLORIDE 1 DROP: 2; 10 SOLUTION/ DROPS OPHTHALMIC at 08:01

## 2021-04-05 RX ADMIN — CYCLOPENTOLATE HYDROCHLORIDE AND PHENYLEPHRINE HYDROCHLORIDE 1 DROP: 2; 10 SOLUTION/ DROPS OPHTHALMIC at 08:13

## 2021-04-05 RX ADMIN — TROPICAMIDE 1 DROP: 10 SOLUTION/ DROPS OPHTHALMIC at 07:53

## 2021-04-05 ASSESSMENT — MIFFLIN-ST. JEOR: SCORE: 1270.71

## 2021-04-05 NOTE — ANESTHESIA POSTPROCEDURE EVALUATION
Patient: Kristin Miranda    Procedure(s):  Cataract removal with implant.    Diagnosis:Cataract [H26.9]  Diagnosis Additional Information: No value filed.    Anesthesia Type:  MAC    Note:  Disposition: Outpatient   Postop Pain Control: Uneventful            Sign Out: Well controlled pain   PONV: No   Neuro/Psych: Uneventful            Sign Out: Acceptable/Baseline neuro status   Airway/Respiratory: Uneventful            Sign Out: Acceptable/Baseline resp. status   CV/Hemodynamics: Uneventful            Sign Out: Acceptable CV status   Other NRE: NONE   DID A NON-ROUTINE EVENT OCCUR? No         Last vitals:  Vitals:    04/05/21 0735   BP: (!) 143/88   Pulse: 71   Resp: 18   Temp: 36.6  C (97.8  F)   SpO2: 97%       Last vitals prior to Anesthesia Care Transfer:  CRNA VITALS  4/5/2021 0848 - 4/5/2021 0919      4/5/2021             Pulse:  80    SpO2:  97 %          Electronically Signed By: ESTRELLA Yousif CRNA  April 5, 2021  9:19 AM

## 2021-04-05 NOTE — OP NOTE
OPHTHALMOLOGY OPERATIVE NOTE    PATIENT: Kristin Miranda  DATE OF SURGERY: 4/5/2021  PREOPERATIVE DIAGNOSIS:  Senile Nuclear Cataract, Right eye  POSTOPERATIVE DIAGNOSIS:  Senile Nuclear Cataract, Right eye  OPERATIVE PROCEDURE:  Phacoemulsification with placement of intraocular lens  SURGEON:  Du Norton MD  ANESTHESIA:  Topical / MAC  EBL:  None  SPECIMENS:  None  COMPLICATIONS:  None    PROCEDURE:  The patient was brought to the operating room at [facility].  The right eye was prepped and draped in the usual fashion for cataract surgery.  A wire lid speculum was inserted.  A super sharp blade was used to make a paracentesis at the 11 O'clock position.  The super sharp blade was used to make a partial thickness temporal groove, which was 3 mm in length.  0.8 mL of non-preserved epi-Shugarcaine was injected into the anterior chamber.  Viscoelastic was used to inflate the anterior chamber through a cannula.  A 2.5 mm microkeratome was used to make a temporal clear corneal incision in a two-plane fashion.  A cystotome needle and forceps were used to make a capsulorrhexis.  Hydrodissection and hydrodelineation were performed with Balance Salt Solution.  The lens was then phacoemulsified and removed without complications.  The cortical material was removed with bimanual irrigation and aspiration.  The capsular bag was filled with viscoelastic.  A posterior chamber intraocular lens, preselected and recorded, was folded and inserted into the capsular bag.  The viscoelastic was removed with the irrigation and aspiration tip.  Balanced Salt Solution with Vigamox, 150mg/0.1mL, was used to refill the anterior chamber.  The wounds were checked for water tightness and required no suture.  The wire lid speculum was removed.  The patient's right eye was cleaned and a drop of each post-operative drop was placed, followed by a trimble shield.  The patient tolerated the procedure well, and there  were no complications.      Du Norton MD

## 2021-04-05 NOTE — ANESTHESIA CARE TRANSFER NOTE
Patient: Kristin Miranda    Procedure(s):  Cataract removal with implant.    Diagnosis: Cataract [H26.9]  Diagnosis Additional Information: No value filed.    Anesthesia Type:   MAC     Note:      Level of Consciousness: awake  Oxygen Supplementation: room air    Independent Airway: airway patency satisfactory and stable  Dentition: dentition unchanged    Report to RN Given: handoff report given  Patient transferred to: Phase II    Handoff Report: Identifed the Patient, Identified the Reponsible Provider, Reviewed the pertinent medical history, Discussed the surgical course, Reviewed Intra-OP anesthesia mangement and issues during anesthesia, Set expectations for post-procedure period and Allowed opportunity for questions and acknowledgement of understanding      Vitals: (Last set prior to Anesthesia Care Transfer)  CRNA VITALS  4/5/2021 0848 - 4/5/2021 0918      4/5/2021             Pulse:  80    SpO2:  97 %        Electronically Signed By: ESTRELLA Yousif CRNA  April 5, 2021  9:18 AM

## 2022-02-10 NOTE — INTERVAL H&P NOTE
I have reviewed the surgical (or preoperative) H&P that is linked to this encounter, and examined the patient. There are no significant changes   ambulatory

## (undated) DEVICE — SOL WATER IRRIG 1000ML BOTTLE 07139-09

## (undated) DEVICE — SOL NACL 0.9% IRRIG 1000ML BOTTLE 07138-09

## (undated) DEVICE — EYE PREP BETADINE 5% SOLUTION 30ML 0065-0411-30

## (undated) DEVICE — EYE SOL BSS 500ML

## (undated) RX ORDER — PROPARACAINE HYDROCHLORIDE 5 MG/ML
SOLUTION/ DROPS OPHTHALMIC
Status: DISPENSED
Start: 2021-03-10

## (undated) RX ORDER — TROPICAMIDE 10 MG/ML
SOLUTION/ DROPS OPHTHALMIC
Status: DISPENSED
Start: 2021-04-05

## (undated) RX ORDER — TROPICAMIDE 10 MG/ML
SOLUTION/ DROPS OPHTHALMIC
Status: DISPENSED
Start: 2021-03-10